# Patient Record
Sex: MALE | Race: WHITE | Employment: UNEMPLOYED | URBAN - METROPOLITAN AREA
[De-identification: names, ages, dates, MRNs, and addresses within clinical notes are randomized per-mention and may not be internally consistent; named-entity substitution may affect disease eponyms.]

---

## 2017-01-01 ENCOUNTER — NURSING HOME VISIT (OUTPATIENT)
Dept: GERIATRICS | Facility: CLINIC | Age: 82
End: 2017-01-01
Payer: COMMERCIAL

## 2017-01-01 VITALS
OXYGEN SATURATION: 88 % | SYSTOLIC BLOOD PRESSURE: 122 MMHG | WEIGHT: 171.2 LBS | TEMPERATURE: 97.3 F | DIASTOLIC BLOOD PRESSURE: 72 MMHG | BODY MASS INDEX: 23.89 KG/M2 | HEART RATE: 86 BPM | RESPIRATION RATE: 18 BRPM

## 2017-01-01 DIAGNOSIS — K21.9 GASTROESOPHAGEAL REFLUX DISEASE WITHOUT ESOPHAGITIS: ICD-10-CM

## 2017-01-01 DIAGNOSIS — I25.9 CHRONIC ISCHEMIC HEART DISEASE: Primary | ICD-10-CM

## 2017-01-01 DIAGNOSIS — M54.50 CHRONIC MIDLINE LOW BACK PAIN WITHOUT SCIATICA: ICD-10-CM

## 2017-01-01 DIAGNOSIS — K59.09 CHRONIC CONSTIPATION: ICD-10-CM

## 2017-01-01 DIAGNOSIS — G89.29 CHRONIC MIDLINE LOW BACK PAIN WITHOUT SCIATICA: ICD-10-CM

## 2017-01-01 DIAGNOSIS — R54 FRAIL ELDERLY: ICD-10-CM

## 2017-01-01 PROCEDURE — 99310 SBSQ NF CARE HIGH MDM 45: CPT | Performed by: FAMILY MEDICINE

## 2017-01-01 PROCEDURE — 99207 ZZC CDG-CORRECTLY CODED, REVIEWED AND AGREE: CPT | Performed by: FAMILY MEDICINE

## 2017-01-08 PROBLEM — R54 FRAIL ELDERLY: Status: ACTIVE | Noted: 2017-01-01

## 2021-04-05 NOTE — PROGRESS NOTES
[facility]    Chief Complaint   Patient presents with     intermediate Regulatory       HPI:   Obtained from the patient, medical record and from the [facility] staffs.     Roland Pak is a 89 year old  (5/15/1921), who is being seen today for a federally mandated E/M visit at [facility]  .     Today's concerns are:  Chronic ischemic heart disease  - Denies CP, palpitation, SOB  - ASA discontinued.       Chronic midline low back pain without sciatica  - Reportedly, symptoms controlled.     Chronic constipation  - No issue with BM.     Gastroesophageal reflux disease without esophagitis  -  Patient denies stomach pain, retrosternal burning, bloating or belching.   .       ALLERGIES: Codeine and Hydrocodone  PAST MEDICAL HISTORY:  has a past medical history of Abnormality of gait; Unspecified sinusitis (chronic); Unspecified transient cerebral ischemia (1995); Esophageal reflux; Old myocardial infarction (2004); and Unspecified transient cerebral ischemia (12/09/2004).  PAST SURGICAL HISTORY:  has past surgical history that includes NONSPECIFIC PROCEDURE; REMV CATARACT EXTRACAP,INSERT LENS (1998); REMOVAL GALLBLADDER; NONSPECIFIC PROCEDURE; cardiac catherization (2004); and EXC PAROTD,TOTAL,UNILAT RAD NECK (05/02/2007).  FAMILY HISTORY: family history includes Breast Cancer in his daughter; CANCER in his brother, brother, daughter, and sister.  SOCIAL HISTORY:  reports that he quit smoking about 35 years ago. His smoking use included Cigarettes. He smoked 0.00 packs per day for 40 years. He has never used smokeless tobacco. He reports that he drinks alcohol. He reports that he does not use illicit drugs.    MEDICATIONS:  Current Outpatient Prescriptions   Medication Sig Dispense Refill     GLYCERIN-HYPROMELLOSE- OP Apply to eye 4 times daily       SIMETHICONE-80 PO Take 80 mg by mouth 4 times daily as needed for intestinal gas       alum & mag hydroxide-simethicone  (MYLANTA/MAALOX) 200-200-20 MG/5ML SUSP Take 30 mLs by mouth 4 times daily as needed for indigestion       OMEPRAZOLE PO Take 40 mg by mouth every morning       ARTIFICIAL TEAR SOLUTION OP Apply to eye every 4 hours as needed       lidocaine (XYLOCAINE) 5 % ointment Apply 1 g topically 2 times daily To left ear lesion       polyethylene glycol (MIRALAX/GLYCOLAX) powder Take 17 g by mouth daily       sodium chloride (OCEAN) 0.65 % nasal spray Spray 2 sprays into both nostrils 2 times daily as needed for congestion       guaiFENesin (ROBITUSSIN) 100 MG/5ML SYRP Take 10 mLs by mouth every 4 hours as needed for cough       TraMADol HCl (ULTRAM PO) Take 50 mg by mouth every 6 hours as needed for moderate to severe pain (also 100 mg QHS and 50 in Qam.)        tamsulosin (FLOMAX) 0.4 MG 24 hr capsule Take 1 capsule (0.4 mg) by mouth daily 30 capsule      acetaminophen (TYLENOL) 325 MG tablet Take 650 mg by mouth 2 times daily        Furosemide (LASIX) 20 MG tablet Take 1 tablet by mouth daily. 90 tablet 1     sennosides (SENOKOT) 8.6 MG tablet Take 1 tablet by mouth 2 times daily. 100 tablet 3     Incontinence Supply Disposable (ATTENDS BRIEFS CLASSIC MEDIUM) MISC 1 each every 2 hours as needed. 60 mL prn     ORDER FOR DME Lift Chair 1 Device 0     Medications reviewed:  Medications reconciled to facility chart and changes were made to reflect current medications as identified as above med list. Below are the changes that were made:   Medications stopped since last EPIC medication reconciliation:   Medications Discontinued During This Encounter   Medication Reason     ASPIRIN PO Medication Reconciliation Clean Up       Medications started since last [facility] medication reconciliation:  Orders Placed This Encounter   Medications     GLYCERIN-HYPROMELLOSE- OP     Sig: Apply to eye 4 times daily     Case Management:  I have reviewed the care plan and MDS and do agree with the plan. Patient's desire to return to the  community is not present.  Information reviewed:  Medications, vital signs, orders, and nursing notes.    ROS:  Unobtainable secondary to cognitive impairment or aphasia.    Exam:  /72 mmHg  Pulse 86  Temp(Src) 97.3  F (36.3  C)  Resp 18  Wt 171 lb 3.2 oz (77.656 kg)  SpO2 88%  GENERAL APPEARANCE:  Alert, thin, cooperative  ENT:  Mouth and posterior oropharynx normal, moist mucous membranes, hearing aids b/l in place, lost some teeth.    EYES:  EOM, Normal lids, pupils and irises  NECK:  No adenopathy,masses or thyromegaly  RESP:  respiratory effort and palpation of chest normal, lungs clear to auscultation , no respiratory distress  CV:  Palpation and auscultation of heart done , regular rate and rhythm, no murmur, rub, or gallop, no edema  ABDOMEN:  normal bowel sounds, soft, nontender, no hepatosplenomegaly or other masses  M/S:   Gait and station abnormal. uses high back wheelchair for all mobility propelled by staff, mechanical lift used for transfers, Digits and nails thickened  SKIN:  Inspection of skin and subcutaneous tissue baseline, Palpation of skin and subcutaneous tissue baseline  NEURO:   Cranial nerves 2-12 are normal tested and grossly at patient's baseline  PSYCH:  oriented to name, memory impaired and BIMS score 7/15, affect is irritable at times, mood is labile.       Lab/Diagnostic data:    Last Basic Metabolic Panel:  NA      139   7/14/2016   POTASSIUM      4.5   12/1/2016  CHLORIDE      107   7/14/2016  JIMI      7.9   7/14/2016  CO2       26   7/14/2016  BUN       18   7/14/2016  CR     1.00   7/14/2016  GLC       78   7/14/2016      ASSESSMENT/PLAN  Chronic ischemic heart disease  - Off ASA. stable    Chronic midline low back pain without sciatica  - Symptoms controlled with the current regimen (APAP and tramadol), continue it    Chronic constipation  - Symptoms controlled with the senna, continue it.       Gastroesophageal reflux disease without esophagitis  - Continued to be on  Omeprazole 40 mg daily. Stable.   - Continue to assess the need, consider reducing to 20 mg.     Frailty:  - Significant  Deficits requiring NH placement  - Requiring extensive assistance from nursing  - Up for meals only o/w spends the day resting in bed    Orders:  - The current medical regimen is effective;  continue present plan and medications.      Total time spent with patient visit was 35 min including patient visit and review of past records.    .    Electronically signed by:  Marlen Luna MD     Nsaids Counseling: NSAID Counseling: I discussed with the patient that NSAIDs should be taken with food. Prolonged use of NSAIDs can result in the development of stomach ulcers.  Patient advised to stop taking NSAIDs if abdominal pain occurs.  The patient verbalized understanding of the proper use and possible adverse effects of NSAIDs.  All of the patient's questions and concerns were addressed.